# Patient Record
Sex: FEMALE | Race: WHITE
[De-identification: names, ages, dates, MRNs, and addresses within clinical notes are randomized per-mention and may not be internally consistent; named-entity substitution may affect disease eponyms.]

---

## 2019-11-27 ENCOUNTER — TRANSCRIPTION ENCOUNTER (OUTPATIENT)
Age: 39
End: 2019-11-27

## 2019-12-02 ENCOUNTER — INPATIENT (INPATIENT)
Facility: HOSPITAL | Age: 39
LOS: 0 days | Discharge: HOME | DRG: 948 | End: 2019-12-03
Attending: INTERNAL MEDICINE | Admitting: HOSPITALIST
Payer: COMMERCIAL

## 2019-12-02 VITALS
SYSTOLIC BLOOD PRESSURE: 125 MMHG | TEMPERATURE: 98 F | HEART RATE: 81 BPM | RESPIRATION RATE: 17 BRPM | OXYGEN SATURATION: 100 % | DIASTOLIC BLOOD PRESSURE: 83 MMHG

## 2019-12-02 DIAGNOSIS — R47.81 SLURRED SPEECH: ICD-10-CM

## 2019-12-02 LAB
ALBUMIN SERPL ELPH-MCNC: 4 G/DL — SIGNIFICANT CHANGE UP (ref 3.3–5)
ALP SERPL-CCNC: 89 U/L — SIGNIFICANT CHANGE UP (ref 40–120)
ALT FLD-CCNC: 19 U/L — SIGNIFICANT CHANGE UP (ref 12–78)
ANION GAP SERPL CALC-SCNC: 7 MMOL/L — SIGNIFICANT CHANGE UP (ref 5–17)
APTT BLD: 31.1 SEC — SIGNIFICANT CHANGE UP (ref 27.5–36.3)
AST SERPL-CCNC: 15 U/L — SIGNIFICANT CHANGE UP (ref 15–37)
BASOPHILS # BLD AUTO: 0.02 K/UL — SIGNIFICANT CHANGE UP (ref 0–0.2)
BASOPHILS NFR BLD AUTO: 0.3 % — SIGNIFICANT CHANGE UP (ref 0–2)
BILIRUB SERPL-MCNC: 0.3 MG/DL — SIGNIFICANT CHANGE UP (ref 0.2–1.2)
BUN SERPL-MCNC: 7 MG/DL — SIGNIFICANT CHANGE UP (ref 7–23)
CALCIUM SERPL-MCNC: 9 MG/DL — SIGNIFICANT CHANGE UP (ref 8.5–10.1)
CHLORIDE SERPL-SCNC: 106 MMOL/L — SIGNIFICANT CHANGE UP (ref 96–108)
CK SERPL-CCNC: 73 U/L — SIGNIFICANT CHANGE UP (ref 26–192)
CO2 SERPL-SCNC: 27 MMOL/L — SIGNIFICANT CHANGE UP (ref 22–31)
CREAT SERPL-MCNC: 0.67 MG/DL — SIGNIFICANT CHANGE UP (ref 0.5–1.3)
EOSINOPHIL # BLD AUTO: 0.01 K/UL — SIGNIFICANT CHANGE UP (ref 0–0.5)
EOSINOPHIL NFR BLD AUTO: 0.2 % — SIGNIFICANT CHANGE UP (ref 0–6)
ERYTHROCYTE [SEDIMENTATION RATE] IN BLOOD: 4 MM/HR — SIGNIFICANT CHANGE UP (ref 0–15)
GLUCOSE SERPL-MCNC: 89 MG/DL — SIGNIFICANT CHANGE UP (ref 70–99)
HCG UR QL: NEGATIVE — SIGNIFICANT CHANGE UP
HCT VFR BLD CALC: 43 % — SIGNIFICANT CHANGE UP (ref 34.5–45)
HGB BLD-MCNC: 14.2 G/DL — SIGNIFICANT CHANGE UP (ref 11.5–15.5)
IMM GRANULOCYTES NFR BLD AUTO: 0.3 % — SIGNIFICANT CHANGE UP (ref 0–1.5)
INR BLD: 1 RATIO — SIGNIFICANT CHANGE UP (ref 0.88–1.16)
LYMPHOCYTES # BLD AUTO: 1.31 K/UL — SIGNIFICANT CHANGE UP (ref 1–3.3)
LYMPHOCYTES # BLD AUTO: 22 % — SIGNIFICANT CHANGE UP (ref 13–44)
MCHC RBC-ENTMCNC: 29.8 PG — SIGNIFICANT CHANGE UP (ref 27–34)
MCHC RBC-ENTMCNC: 33 GM/DL — SIGNIFICANT CHANGE UP (ref 32–36)
MCV RBC AUTO: 90.1 FL — SIGNIFICANT CHANGE UP (ref 80–100)
MONOCYTES # BLD AUTO: 0.41 K/UL — SIGNIFICANT CHANGE UP (ref 0–0.9)
MONOCYTES NFR BLD AUTO: 6.9 % — SIGNIFICANT CHANGE UP (ref 2–14)
NEUTROPHILS # BLD AUTO: 4.19 K/UL — SIGNIFICANT CHANGE UP (ref 1.8–7.4)
NEUTROPHILS NFR BLD AUTO: 70.3 % — SIGNIFICANT CHANGE UP (ref 43–77)
PLATELET # BLD AUTO: 251 K/UL — SIGNIFICANT CHANGE UP (ref 150–400)
POTASSIUM SERPL-MCNC: 3.6 MMOL/L — SIGNIFICANT CHANGE UP (ref 3.5–5.3)
POTASSIUM SERPL-SCNC: 3.6 MMOL/L — SIGNIFICANT CHANGE UP (ref 3.5–5.3)
PROT SERPL-MCNC: 7.2 GM/DL — SIGNIFICANT CHANGE UP (ref 6–8.3)
PROTHROM AB SERPL-ACNC: 11.1 SEC — SIGNIFICANT CHANGE UP (ref 10–12.9)
RBC # BLD: 4.77 M/UL — SIGNIFICANT CHANGE UP (ref 3.8–5.2)
RBC # FLD: 12.6 % — SIGNIFICANT CHANGE UP (ref 10.3–14.5)
SODIUM SERPL-SCNC: 140 MMOL/L — SIGNIFICANT CHANGE UP (ref 135–145)
TROPONIN I SERPL-MCNC: <0.015 NG/ML — SIGNIFICANT CHANGE UP (ref 0.01–0.04)
WBC # BLD: 5.96 K/UL — SIGNIFICANT CHANGE UP (ref 3.8–10.5)
WBC # FLD AUTO: 5.96 K/UL — SIGNIFICANT CHANGE UP (ref 3.8–10.5)

## 2019-12-02 PROCEDURE — 82164 ANGIOTENSIN I ENZYME TEST: CPT

## 2019-12-02 PROCEDURE — 70496 CT ANGIOGRAPHY HEAD: CPT | Mod: 26

## 2019-12-02 PROCEDURE — 83036 HEMOGLOBIN GLYCOSYLATED A1C: CPT

## 2019-12-02 PROCEDURE — 86235 NUCLEAR ANTIGEN ANTIBODY: CPT

## 2019-12-02 PROCEDURE — 85610 PROTHROMBIN TIME: CPT

## 2019-12-02 PROCEDURE — 72157 MRI CHEST SPINE W/O & W/DYE: CPT

## 2019-12-02 PROCEDURE — 71045 X-RAY EXAM CHEST 1 VIEW: CPT | Mod: 26

## 2019-12-02 PROCEDURE — 86618 LYME DISEASE ANTIBODY: CPT

## 2019-12-02 PROCEDURE — A9579: CPT

## 2019-12-02 PROCEDURE — 93010 ELECTROCARDIOGRAM REPORT: CPT

## 2019-12-02 PROCEDURE — 86225 DNA ANTIBODY NATIVE: CPT

## 2019-12-02 PROCEDURE — 83090 ASSAY OF HOMOCYSTEINE: CPT

## 2019-12-02 PROCEDURE — 86431 RHEUMATOID FACTOR QUANT: CPT

## 2019-12-02 PROCEDURE — 85025 COMPLETE CBC W/AUTO DIFF WBC: CPT

## 2019-12-02 PROCEDURE — 97162 PT EVAL MOD COMPLEX 30 MIN: CPT | Mod: GP

## 2019-12-02 PROCEDURE — 80061 LIPID PANEL: CPT

## 2019-12-02 PROCEDURE — 70498 CT ANGIOGRAPHY NECK: CPT | Mod: 26

## 2019-12-02 PROCEDURE — 84443 ASSAY THYROID STIM HORMONE: CPT

## 2019-12-02 PROCEDURE — 82306 VITAMIN D 25 HYDROXY: CPT

## 2019-12-02 PROCEDURE — 83735 ASSAY OF MAGNESIUM: CPT

## 2019-12-02 PROCEDURE — 72156 MRI NECK SPINE W/O & W/DYE: CPT

## 2019-12-02 PROCEDURE — 86200 CCP ANTIBODY: CPT

## 2019-12-02 PROCEDURE — 84100 ASSAY OF PHOSPHORUS: CPT

## 2019-12-02 PROCEDURE — 85652 RBC SED RATE AUTOMATED: CPT

## 2019-12-02 PROCEDURE — 72157 MRI CHEST SPINE W/O & W/DYE: CPT | Mod: 26

## 2019-12-02 PROCEDURE — 80053 COMPREHEN METABOLIC PANEL: CPT

## 2019-12-02 PROCEDURE — 81001 URINALYSIS AUTO W/SCOPE: CPT

## 2019-12-02 PROCEDURE — 99284 EMERGENCY DEPT VISIT MOD MDM: CPT

## 2019-12-02 PROCEDURE — 70553 MRI BRAIN STEM W/O & W/DYE: CPT

## 2019-12-02 PROCEDURE — 70553 MRI BRAIN STEM W/O & W/DYE: CPT | Mod: 26

## 2019-12-02 PROCEDURE — 85730 THROMBOPLASTIN TIME PARTIAL: CPT

## 2019-12-02 PROCEDURE — 36415 COLL VENOUS BLD VENIPUNCTURE: CPT

## 2019-12-02 PROCEDURE — 86160 COMPLEMENT ANTIGEN: CPT

## 2019-12-02 PROCEDURE — 97116 GAIT TRAINING THERAPY: CPT | Mod: GP

## 2019-12-02 PROCEDURE — 82550 ASSAY OF CK (CPK): CPT

## 2019-12-02 PROCEDURE — 82607 VITAMIN B-12: CPT

## 2019-12-02 PROCEDURE — 83921 ORGANIC ACID SINGLE QUANT: CPT

## 2019-12-02 PROCEDURE — 86039 ANTINUCLEAR ANTIBODIES (ANA): CPT

## 2019-12-02 PROCEDURE — 86255 FLUORESCENT ANTIBODY SCREEN: CPT

## 2019-12-02 RX ORDER — ONDANSETRON 8 MG/1
4 TABLET, FILM COATED ORAL EVERY 6 HOURS
Refills: 0 | Status: DISCONTINUED | OUTPATIENT
Start: 2019-12-02 | End: 2019-12-03

## 2019-12-02 RX ORDER — ACETAMINOPHEN 500 MG
650 TABLET ORAL EVERY 6 HOURS
Refills: 0 | Status: DISCONTINUED | OUTPATIENT
Start: 2019-12-02 | End: 2019-12-02

## 2019-12-02 RX ORDER — ACETAMINOPHEN 500 MG
650 TABLET ORAL EVERY 6 HOURS
Refills: 0 | Status: DISCONTINUED | OUTPATIENT
Start: 2019-12-02 | End: 2019-12-03

## 2019-12-02 RX ORDER — SODIUM CHLORIDE 9 MG/ML
3 INJECTION INTRAMUSCULAR; INTRAVENOUS; SUBCUTANEOUS ONCE
Refills: 0 | Status: COMPLETED | OUTPATIENT
Start: 2019-12-02 | End: 2019-12-02

## 2019-12-02 RX ADMIN — SODIUM CHLORIDE 3 MILLILITER(S): 9 INJECTION INTRAMUSCULAR; INTRAVENOUS; SUBCUTANEOUS at 16:40

## 2019-12-02 NOTE — ED PROVIDER NOTE - CONSTITUTIONAL, MLM
normal... Well appearing, awake, alert, oriented to person, place, time/situation and in no apparent distress. thin.

## 2019-12-02 NOTE — CONSULT NOTE ADULT - SUBJECTIVE AND OBJECTIVE BOX
CC: 39y old  Female who presents with a chief complaint of Numbness/pain/weakness right upper/B LE, speech disturbance, disorientation.     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 Pt states she has been having trouble walking and paresthesias / weakness of LE's since past few days, late this morning her speech was slowed, she had difficulty articulating and she felt slightly disoriented, in addition, she noted her right FMM were not normal, prompting her to come to ED.    Pt further on reports that she started having right hand weakness / pain 2 months ago, was seen in HSS, diagnosed with potential thoracic outlook syndrome, was referred to PT that she has been attending. Pt noted some improvement of right hand paresthesias but noted paresthesias / pain in legs, had difficulty ambulating, tripped / stumbled few times. she was given gabapentin., took one pill without much relief.     Pt has episodes of migraines and neck pain related to sports injury since she was 15, hasn't had any HA in a year and a half.     Pt denies history of preceding fever, rash, diarrhea or tick bites; she reports mild dizziness and more recent mild urinary overflow incontinence.        PAST MEDICAL & SURGICAL HISTORY:  Compartment syndrome LE's; s/p release  Cervical radiculopathy        FAMILY HISTORY: Thyroid disease      Social Hx:  Nonsmoker, no drug or alcohol use, , parent, no dietary prefernces      MEDICATIONS  (STANDING):  Nil    Allergies  No Known Allergies    Intolerances      ROS: Pertinent positives in HPI, all other ROS were reviewed and are negative.        Vital Signs Last 24 Hrs  T(C): 36.4 (02 Dec 2019 14:05), Max: 36.4 (02 Dec 2019 14:05)  T(F): 97.5 (02 Dec 2019 14:05), Max: 97.5 (02 Dec 2019 14:05)  HR: 81 (02 Dec 2019 14:05) (81 - 81)  BP: 125/83 (02 Dec 2019 14:05) (125/83 - 125/83)  BP(mean): --  RR: 17 (02 Dec 2019 14:05) (17 - 17)  SpO2: 100% (02 Dec 2019 14:05) (100% - 100%)      Gen exam:  Normocephalic, not in distress  HEENT: PERRLA, EOMI,   Neck: Supple.  Respiratory: Breath sounds are clear bilaterally  Cardiovascular: S1 and S2, regular   Extremities:  no edema  Vascular: Caritid Bruit - no  Musculoskeletal: no joint swelling/tenderness, no abnormal movements  Skin: No rashes    Neurological exam:  HF: A x O x 3. Appropriately interactive, normal affect. Speech fluent, No Aphasia or paraphasic errors. Naming /repetition intact   CN: SOPHIA, EOMI, VFF, facial sensation normal, no NLFD, tongue midline, Palate moves equally, SCM equal bilaterally  Motor: No pronator drift, Strength 5/5 in all 4 ext, normal bulk and tone, no tremor, rigidity .    Sens: Decreased PP right L4/5, Sensory level T7/8   Reflexes: BJ 2+, BR 3+, KJ 3++, AJ 2+, downgoing toes b/l, no clonus  Coord:  No FNFA, dysmetria, ALICE intact   Gait/Balance: Not tested      Labs:   12-02    140  |  106  |  7   ----------------------------<  89  3.6   |  27  |  0.67    Ca    9.0      02 Dec 2019 14:56    TPro  7.2  /  Alb  4.0  /  TBili  0.3  /  DBili  x   /  AST  15  /  ALT  19  /  AlkPhos  89  12-02                          14.2   5.96  )-----------( 251      ( 02 Dec 2019 14:56 )             43.0       Radiology:  - CT: < from: CT Angio Neck w/ IV Cont (12.02.19 @ 15:18) >  Carotid circulation: No hemodynamically significant stenosis as above.    Intracranial circulation:  No hemodynamically significant stenosis.    Brain:  No acute infarct or hemorrhage.

## 2019-12-02 NOTE — ED ADULT NURSE NOTE - OBJECTIVE STATEMENT
pt to ed for increasing weakness, change in speech, and feelings of disorientation. Pt ambulatory w/ distress, speech noted to be normal. VSS. Complains of generalized weakNESS. Pt reports nerve issues and has been seeing a neurologist and was advised if her symptoms worsened she should come to ED. PT a&ox3, w/o distress. Right wrist brace in place due to nerve issues and weakness as per pt.

## 2019-12-02 NOTE — ED STATDOCS - NS_ ATTENDINGSCRIBEDETAILS _ED_A_ED_FT
I, Miky Cárdenas MD,  performed the initial face to face bedside interview with this patient regarding history of present illness, review of symptoms and relevant past medical, social and family history.  I completed an independent physical examination.  I was the initial provider who evaluated this patient.  The history, relevant review of systems, past medical and surgical history, medical decision making, and physical examination was documented by the scribe in my presence and I attest to the accuracy of the documentation.

## 2019-12-02 NOTE — ED PROVIDER NOTE - PROGRESS NOTE DETAILS
d/w dr. forbes , attdg neuro oncall. agrees with admit. MRIs in the AM. d/w pt. agreeable with admit. left vm on hospitalist admit phone. MD SARITHA  saw pt. in ED. rec mri brain and thoracic with and without contrast and vascultis work up. MD SARITHA

## 2019-12-02 NOTE — ED ADULT NURSE REASSESSMENT NOTE - NS ED NURSE REASSESS COMMENT FT1
Patient received from previous nurse. Pt is A&Ox4, VSS on RA. Pt is resting comfortably in their bed at this time, denies any pain or discomfort at this time with family at bedside. Safety and comfort measures in place. Hourly rounding will be done on my time. Will continue to monitor.

## 2019-12-02 NOTE — H&P ADULT - HISTORY OF PRESENT ILLNESS
38 y/o F with PMH of compartment syndrome of B/L LEs s/p surgery, cervical radiculopathy, migraines, p/w numbness / tingling of hands / feet, disorientation, slowed speech. Patient states that on 10/11/19 she developed R hand stiffness, numbness, pain and weakness. For example she was unable to hold a cup of coffee or mug with that hand. She also had neck pain. She had a MRI of cervical spine and was told she had cervical radiculopathy. She has been having physical therapy, and that has improved symptoms greatly, but not 100% yet. Patient states that at the time she also had some numbness and pain of the feet as well. She has also been told she may have carpal tunnel and thoracic outlet syndrome. On 11/24/19 she started to develop increasing numbness and pain of B/L feet and also legs. States that she was starting lose her balance and was tripping over a lot. She felt like she was hobbling to the bathroom. Over the weekend she also developed slowing of speech, felt like her lips weren't able to keep up with speaking. She also felt a bit disoriented, she would turn on hot water to brush teeth for example. She has been some occasional back pain. Denies any urinary incontinence, except occasionally when she sneezes. Denies fecal incontinence. Denies saddle anesthesia. Denies fever, chills, cough, runny nose, sore throat, nausea, vomiting, abdominal pain, CP, SOB, dysuria, increased frequency, neck pain, photophobia, phonophobia, HA, abdominal pain.     PSH: Compartment syndrome B/L LEs s/p surgery    Social Hx: Denies tobacco, etoh - approx 1 drink per week, drugs - denies     Family Hx: Father - thyroid cancer, prostate cancer, mother - breast cancer / thyroid disease

## 2019-12-02 NOTE — ED PROVIDER NOTE - CLINICAL SUMMARY MEDICAL DECISION MAKING FREE TEXT BOX
Pt had CTA brain, neck in ED that were normal, labs normal, plan to discuss with neuro for any further testing in the ED and dispo

## 2019-12-02 NOTE — ED PROVIDER NOTE - NEUROLOGICAL, MLM
Alert and oriented, no focal deficits, no motor or sensory deficits. NIHSS 0 Alert and oriented, no focal deficits, no motor or sensory deficits. NIHSS 0, 2+ patellar reflex bilateral, 2+ UE relflex Bilateral

## 2019-12-02 NOTE — CONSULT NOTE ADULT - ASSESSMENT
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 She presents to the ED today with c/o worsening paresthesias / feeling of weakness LE's s, slowed speech, had difficulty articulating and felt slightly disoriented, in addition, noted her right FMM were not normal. Pt denies preceding fever, rash, diarrhea or tick bites; she reports mild dizziness and more recent mild urinary overflow incontinence.    # Rule out myelitis thoracic spine; demyelinating disease or Mononeuritis multiplex    - Obtain MRI brain/ thoracic spine with contrast.  - labs for vasculitis / autoimmune ds  - As D/W pt, may need spinal tap  - PT eval    D/W pt, her family and Dr. Coleman

## 2019-12-02 NOTE — H&P ADULT - NEUROLOGICAL DETAILS
responds to pain/responds to verbal commands/superficial reflexes intact/alert and oriented x 3/cranial nerves intact/normal strength

## 2019-12-02 NOTE — ED PROVIDER NOTE - OBJECTIVE STATEMENT
38 y/o female with a PMHx of cervical radiculopathy presents to the ED c/o disoriented and slowed speech. Pt states she has been having trouble walking the past few days. Started late this morning pt states her speech was even more slowed and she was very disoriented. Pt had an episode where her hand stopped working and is in physical therapy and diagnosed with potential thoracic outlook syndrome. Pt found that for the past week her legs have been numb and she has been stumbling. No recent trauma. Pt took one pill of gabapentin but nothing besides that. Nonsmoker. Pt has episodes of migraines from a sports injury when she was 15 but hasn't had one in a year and a half. Pt states these symptoms are not like the migraines. No hx of any neurological issues. Pt ate today and denies nausea or vomiting. No tick bites that she knows of.

## 2019-12-02 NOTE — ED STATDOCS - CLINICAL SUMMARY MEDICAL DECISION MAKING FREE TEXT BOX
39F presents to the ED with slurred speech, difficulty getting words out, numbness. Sx worsening over past few days. Last known normal 2 days ago. Will send pt to main ED for further evaluation.

## 2019-12-02 NOTE — H&P ADULT - ASSESSMENT
40 y/o F with PMH of compartment syndrome of B/L LEs s/p surgery, cervical radiculopathy, migraines, p/w numbness / tingling of hands / feet, disorientation, slowed speech    *Paresthesias / weakness of hands and feet + Disorientation + slowed speech   -CTA does not report any acute findings  -Patient was evaluated at bedside by Neurologist Dr. Haynes -> recommends brain / thoracic MRI w/ contrast and work up for vasculitis / autoimmune disease + may need spinal tap  -Rheumatology consult for further evaluation  -PT eval  -Fall precautions    *DVT ppx  -SCDs 40 y/o F with PMH of compartment syndrome of B/L LEs s/p surgery, cervical radiculopathy, migraines, p/w numbnes / tingling of hands / feet, disorientation, slowed speech    *Paresthesias / weakness of hands and feet + Disorientation + slowed speech   -CTA does not report any acute findings  -Patient was evaluated at bedside by Neurologist Dr. Haynes -> recommends brain / thoracic MRI w/ contrast and work up for vasculitis / autoimmune disease + may need spinal tap  -Rheumatology consult for further evaluation  -PT eval  -Fall precautions    *DVT ppx  -SCDs       IMPROVE VTE Individual Risk Assessment    RISK                                                                Points    [  ] Previous VTE                                                  3    [  ] Thrombophilia                                               2    [  ] Lower limb paralysis                                      2        (unable to hold up >15 seconds)      [  ] Current Cancer                                              2         (within 6 months)    [  ] Immobilization > 24 hrs                                1    [  ] ICU/CCU stay > 24 hours                              1    [  ] Age > 60                                                      1    IMPROVE VTE Score _____0____    IMPROVE Score 0-1: Low Risk, No VTE prophylaxis required for most patients, encourage ambulation.   IMPROVE Score 2-3: At risk, pharmacologic VTE prophylaxis is indicated for most patients (in the absence of a contraindication)  IMPROVE Score > or = 4: High Risk, pharmacologic VTE prophylaxis is indicated for most patients (in the absence of a contraindication)

## 2019-12-02 NOTE — ED STATDOCS - PROGRESS NOTE DETAILS
Elyse HILLIARD for ED attending, Dr. Cárdenas: 39F presents to the ED with slurred speech, difficulty getting words out, numbness. Sx worsening over past few days, Last known normal 2 days ago. Will send pt to main ED for further evaluation.

## 2019-12-02 NOTE — ED ADULT TRIAGE NOTE - CHIEF COMPLAINT QUOTE
pt aaox4, pt presents to er for disorientation started this morning around 1100, pt states she was recently diagnosed with cervical radiculopathy and posible thoracic outlet syndrome 2 months ago, started to have difficulty walking 3 days ago, having difficulty talking and disorientation with some numbness/pain b/l lower extremities.  md cortes made aware, stroke eval done in [place], fs 75, code stroke not called, lams 0

## 2019-12-03 ENCOUNTER — TRANSCRIPTION ENCOUNTER (OUTPATIENT)
Age: 39
End: 2019-12-03

## 2019-12-03 VITALS
TEMPERATURE: 98 F | OXYGEN SATURATION: 100 % | SYSTOLIC BLOOD PRESSURE: 134 MMHG | HEART RATE: 85 BPM | RESPIRATION RATE: 18 BRPM | DIASTOLIC BLOOD PRESSURE: 77 MMHG

## 2019-12-03 LAB
24R-OH-CALCIDIOL SERPL-MCNC: 19.1 NG/ML — LOW (ref 30–80)
ALBUMIN SERPL ELPH-MCNC: 3.5 G/DL — SIGNIFICANT CHANGE UP (ref 3.3–5)
ALP SERPL-CCNC: 78 U/L — SIGNIFICANT CHANGE UP (ref 40–120)
ALT FLD-CCNC: 17 U/L — SIGNIFICANT CHANGE UP (ref 12–78)
ANION GAP SERPL CALC-SCNC: 5 MMOL/L — SIGNIFICANT CHANGE UP (ref 5–17)
APPEARANCE UR: CLEAR — SIGNIFICANT CHANGE UP
APTT BLD: 30.8 SEC — SIGNIFICANT CHANGE UP (ref 27.5–36.3)
AST SERPL-CCNC: 12 U/L — LOW (ref 15–37)
BASOPHILS # BLD AUTO: 0.02 K/UL — SIGNIFICANT CHANGE UP (ref 0–0.2)
BASOPHILS NFR BLD AUTO: 0.4 % — SIGNIFICANT CHANGE UP (ref 0–2)
BILIRUB SERPL-MCNC: 0.6 MG/DL — SIGNIFICANT CHANGE UP (ref 0.2–1.2)
BILIRUB UR-MCNC: NEGATIVE — SIGNIFICANT CHANGE UP
BUN SERPL-MCNC: 10 MG/DL — SIGNIFICANT CHANGE UP (ref 7–23)
CALCIUM SERPL-MCNC: 8.4 MG/DL — LOW (ref 8.5–10.1)
CHLORIDE SERPL-SCNC: 105 MMOL/L — SIGNIFICANT CHANGE UP (ref 96–108)
CHOLEST SERPL-MCNC: 160 MG/DL — SIGNIFICANT CHANGE UP (ref 10–199)
CK SERPL-CCNC: 52 U/L — SIGNIFICANT CHANGE UP (ref 26–192)
CO2 SERPL-SCNC: 27 MMOL/L — SIGNIFICANT CHANGE UP (ref 22–31)
COLOR SPEC: YELLOW — SIGNIFICANT CHANGE UP
CREAT SERPL-MCNC: 0.79 MG/DL — SIGNIFICANT CHANGE UP (ref 0.5–1.3)
DIFF PNL FLD: ABNORMAL
EOSINOPHIL # BLD AUTO: 0.07 K/UL — SIGNIFICANT CHANGE UP (ref 0–0.5)
EOSINOPHIL NFR BLD AUTO: 1.4 % — SIGNIFICANT CHANGE UP (ref 0–6)
GLUCOSE SERPL-MCNC: 87 MG/DL — SIGNIFICANT CHANGE UP (ref 70–99)
GLUCOSE UR QL: NEGATIVE MG/DL — SIGNIFICANT CHANGE UP
HBA1C BLD-MCNC: 4.8 % — SIGNIFICANT CHANGE UP (ref 4–5.6)
HCT VFR BLD CALC: 39.7 % — SIGNIFICANT CHANGE UP (ref 34.5–45)
HDLC SERPL-MCNC: 65 MG/DL — SIGNIFICANT CHANGE UP
HGB BLD-MCNC: 13.2 G/DL — SIGNIFICANT CHANGE UP (ref 11.5–15.5)
IMM GRANULOCYTES NFR BLD AUTO: 0.2 % — SIGNIFICANT CHANGE UP (ref 0–1.5)
INR BLD: 1.06 RATIO — SIGNIFICANT CHANGE UP (ref 0.88–1.16)
KETONES UR-MCNC: ABNORMAL
LEUKOCYTE ESTERASE UR-ACNC: ABNORMAL
LIPID PNL WITH DIRECT LDL SERPL: 83 MG/DL — SIGNIFICANT CHANGE UP
LYME C6 AB IGG/IGM EIA REFLEX WESTERN BL: SIGNIFICANT CHANGE UP
LYMPHOCYTES # BLD AUTO: 1.53 K/UL — SIGNIFICANT CHANGE UP (ref 1–3.3)
LYMPHOCYTES # BLD AUTO: 31.3 % — SIGNIFICANT CHANGE UP (ref 13–44)
MAGNESIUM SERPL-MCNC: 2 MG/DL — SIGNIFICANT CHANGE UP (ref 1.6–2.6)
MCHC RBC-ENTMCNC: 30 PG — SIGNIFICANT CHANGE UP (ref 27–34)
MCHC RBC-ENTMCNC: 33.2 GM/DL — SIGNIFICANT CHANGE UP (ref 32–36)
MCV RBC AUTO: 90.2 FL — SIGNIFICANT CHANGE UP (ref 80–100)
MONOCYTES # BLD AUTO: 0.36 K/UL — SIGNIFICANT CHANGE UP (ref 0–0.9)
MONOCYTES NFR BLD AUTO: 7.4 % — SIGNIFICANT CHANGE UP (ref 2–14)
NEUTROPHILS # BLD AUTO: 2.9 K/UL — SIGNIFICANT CHANGE UP (ref 1.8–7.4)
NEUTROPHILS NFR BLD AUTO: 59.3 % — SIGNIFICANT CHANGE UP (ref 43–77)
NITRITE UR-MCNC: NEGATIVE — SIGNIFICANT CHANGE UP
PH UR: 6 — SIGNIFICANT CHANGE UP (ref 5–8)
PHOSPHATE SERPL-MCNC: 2.7 MG/DL — SIGNIFICANT CHANGE UP (ref 2.5–4.5)
PLATELET # BLD AUTO: 211 K/UL — SIGNIFICANT CHANGE UP (ref 150–400)
POTASSIUM SERPL-MCNC: 3.8 MMOL/L — SIGNIFICANT CHANGE UP (ref 3.5–5.3)
POTASSIUM SERPL-SCNC: 3.8 MMOL/L — SIGNIFICANT CHANGE UP (ref 3.5–5.3)
PROT SERPL-MCNC: 6.4 GM/DL — SIGNIFICANT CHANGE UP (ref 6–8.3)
PROT UR-MCNC: 30 MG/DL
PROTHROM AB SERPL-ACNC: 11.8 SEC — SIGNIFICANT CHANGE UP (ref 10–12.9)
RBC # BLD: 4.4 M/UL — SIGNIFICANT CHANGE UP (ref 3.8–5.2)
RBC # FLD: 12.7 % — SIGNIFICANT CHANGE UP (ref 10.3–14.5)
RHEUMATOID FACT SERPL-ACNC: <10 IU/ML — SIGNIFICANT CHANGE UP (ref 0–13)
SODIUM SERPL-SCNC: 137 MMOL/L — SIGNIFICANT CHANGE UP (ref 135–145)
SP GR SPEC: 1.02 — SIGNIFICANT CHANGE UP (ref 1.01–1.02)
TOTAL CHOLESTEROL/HDL RATIO MEASUREMENT: 2.5 RATIO — LOW (ref 3.3–7.1)
TRIGL SERPL-MCNC: 62 MG/DL — SIGNIFICANT CHANGE UP (ref 10–149)
UROBILINOGEN FLD QL: NEGATIVE MG/DL — SIGNIFICANT CHANGE UP
VIT B12 SERPL-MCNC: 818 PG/ML — SIGNIFICANT CHANGE UP (ref 232–1245)
WBC # BLD: 4.89 K/UL — SIGNIFICANT CHANGE UP (ref 3.8–10.5)
WBC # FLD AUTO: 4.89 K/UL — SIGNIFICANT CHANGE UP (ref 3.8–10.5)

## 2019-12-03 PROCEDURE — 99233 SBSQ HOSP IP/OBS HIGH 50: CPT

## 2019-12-03 PROCEDURE — 99254 IP/OBS CNSLTJ NEW/EST MOD 60: CPT

## 2019-12-03 PROCEDURE — 72156 MRI NECK SPINE W/O & W/DYE: CPT | Mod: 26

## 2019-12-03 NOTE — DISCHARGE NOTE PROVIDER - HOSPITAL COURSE
38 y/o F with PMH of compartment syndrome of B/L LEs s/p surgery, cervical radiculopathy, migraines, p/w numbness / tingling of hands / feet, disorientation, slowed speech. Patient states that on 10/11/19 she developed R hand stiffness, numbness, pain and weakness. For example she was unable to hold a cup of coffee or mug with that hand. She also had neck pain. She had a MRI of cervical spine and was told she had cervical radiculopathy. She has been having physical therapy, and that has improved symptoms greatly, but not 100% yet. Patient states that at the time she also had some numbness and pain of the feet as well. She has also been told she may have carpal tunnel and thoracic outlet syndrome. On 11/24/19 she started to develop increasing numbness and pain of B/L feet and also legs. States that she was starting lose her balance and was tripping over a lot. She felt like she was hobbling to the bathroom. Over the weekend she also developed slowing of speech, felt like her lips weren't able to keep up with speaking.     So far her w/u has been negative, Rheum service was asked to pt to r/o CTD    She admits to a remote h/o Raynauds. She denies alopecia, oral ulcers, sicca sx or joint pains on a daily basis. She denies asthma frequent sinus or ear infections.         pt has features that are suggesting anorexia, v thin, looks 10 years younger than her age         F/U labs for vasculitis / autoimmune ds, deficiency states    - AT this time, need for spinal tap is not mandatory, as pt has soft sx and is not keen on LP    - PT will need EMG/NCV studies as OP    cleared for dc by neuro    esr of 4

## 2019-12-03 NOTE — CONSULT NOTE ADULT - ASSESSMENT
38 y/o F with PMH of compartment syndrome of B/L LEs s/p surgery, cervical radiculopathy, migraines, p/w numbness / tingling of hands / feet, disorientation, slowed speech. Patient states that on 10/11/19 she developed R hand stiffness, numbness, pain and weakness. For example she was unable to hold a cup of coffee or mug with that hand. She also had neck pain. She was dx with cervical radiculopathy with improvement in sx with PT   She now comes in worsening sx, she has been seen neurology, so far imaging has been normal. Rheum service was asked to see pt to r/o CTD.   At this time her review of systems is positive for a remote h/o raynauds and her current neurological sx. On my exam, there is a paucity of clinical findings to support the dx for an underlying autoimmune issue.    At this time my suspicion for an underlying CTD is low but for completion pt is to have baseline labs done    Plan-  - will obtain serologies to r/o CTD  - pt to call in 1 week to outpt will review labs if there is any abnormality she will come in for f/u  - pt agreeable to plan

## 2019-12-03 NOTE — PHYSICAL THERAPY INITIAL EVALUATION ADULT - ACTIVE RANGE OF MOTION EXAMINATION, REHAB EVAL
bilateral upper extremity Active ROM was WFL (within functional limits)/bilateral  lower extremity Active ROM was WFL (within functional limits)/LUE lagging vs R, maintains hands stiffly

## 2019-12-03 NOTE — PHYSICAL THERAPY INITIAL EVALUATION ADULT - PERTINENT HX OF CURRENT PROBLEM, REHAB EVAL
PMH of compartment syndrome of B/L LEs s/p surgery, cervical radiculopathy, migraines,recent poss dx of thoracic outlet syndrome p/w numbness / tingling of hands / feet, disorientation, slowed speech. CTA head/neck neg. , MRI TS neg ac. path (+mild scoliosis), MRI head neg ac. finding (except +sinusitis)

## 2019-12-03 NOTE — PROGRESS NOTE ADULT - SUBJECTIVE AND OBJECTIVE BOX
Pt reports aching and paresthesias lower extremities; slightly improved, tightness around mouth has resolved.  Able to ambulate well, no bladder/bowel dysfx    MRI brain and thoracic spine reveal no lesion c/w inflammatory / demyelinating ds  Labs ESR 4, CPK nl     ROS: As above; other ROS Negative    MEDICATIONS  (STANDING):  Nil    Vital Signs Last 24 Hrs  T(C): 36.8 (03 Dec 2019 11:00), Max: 36.9 (02 Dec 2019 19:20)  T(F): 98.2 (03 Dec 2019 11:00), Max: 98.4 (02 Dec 2019 19:20)  HR: 85 (03 Dec 2019 11:00) (72 - 85)  BP: 134/77 (03 Dec 2019 11:00) (104/61 - 134/77)  BP(mean): --  RR: 18 (03 Dec 2019 11:00) (16 - 18)  SpO2: 100% (03 Dec 2019 11:00) (98% - 100%)    Gen exam:  Normocephalic, not in distress  HEENT: PERRLA, EOMI,   Neck: Supple.  Respiratory: Breath sounds are clear bilaterally  Cardiovascular: S1 and S2, regular   Extremities:  no edema  Vascular: Caritid Bruit - no  Musculoskeletal: no joint swelling/tenderness, no abnormal movements  Skin: No rashes    Neurological exam:  HF: A x O x 3. Appropriately interactive, normal affect. Speech fluent, No Aphasia or paraphasic errors. Naming /repetition intact   CN: SOPHIA, EOMI, VFF, facial sensation normal, no NLFD, tongue midline, Palate moves equally, SCM equal bilaterally  Motor: No pronator drift, Strength 5/5 in all 4 ext, normal bulk and tone, no tremor, rigidity .    Sens: Decreased PP right L4/5, Sensory level T7/8 less distinct  Reflexes: BJ 2+, BR 3+, KJ 3++, AJ 2+, downgoing toes b/l, no clonus  Coord:  No FNFA, dysmetria, ALICE intact   Gait/Balance: Not tested    Labs:    Creatine Kinase, Serum: 73 U/L (12.02.19 @ 14:56)  Sedimentation Rate, Erythrocyte: 4 mm/hr (12.02.19 @ 17:10)                         13.2   4.89  )-----------( 211      ( 03 Dec 2019 07:44 )             39.7     12-03    137  |  105  |  10  ----------------------------<  87  3.8   |  27  |  0.79    Ca    8.4<L>      03 Dec 2019 07:44  Phos  2.7     12-03  Mg     2.0     12-03    TPro  6.4  /  Alb  3.5  /  TBili  0.6  /  DBili  x   /  AST  12<L>  /  ALT  17  /  AlkPhos  78  12-03      Radiology report:  < from: MR Head w/wo IV Cont (12.02.19 @ 22:03) >  IMPRESSION:   1. No evidence of acute intracranial pathology.   2. Fluidlevel is in dependent portion of right sphenoid sinus. Finding   is consistent with acute sinusitis.    < from: MR Thoracic Spine w/wo IV Cont (12.02.19 @ 22:07) >  IMPRESSION:   1. Mild scoliosis in thoracic spine. Note loss of normal cervical   lordosis which may reflect pain or muscle spasm on  sequence  2. No evidence of acute pathology.

## 2019-12-03 NOTE — DISCHARGE NOTE PROVIDER - REASON FOR ADMISSION
p/w numbness / tingling of hands / feet, disorientation, slowed speech p/w numbnes / tingling of hands / feet, disorientation, slowed speech

## 2019-12-03 NOTE — DISCHARGE NOTE PROVIDER - PROVIDER TOKENS
PROVIDER:[TOKEN:[[id number]]],PROVIDER:[TOKEN:[[id number]]],PROVIDER:[TOKEN:[[id number]]]

## 2019-12-03 NOTE — PHYSICAL THERAPY INITIAL EVALUATION ADULT - GENERAL OBSERVATIONS, REHAB EVAL
pt rec'd supine in bed on peds. pleased to see PT. c/o are variable and pt reports no exacerbating factors, no remitting factors except rest. upon supine to sit pt reports R foot feeling cold-PT asked about ?Raynaud's -pt reports remote hx. PT also inquiring re: ?vit def-pt reports poss. D def.

## 2019-12-03 NOTE — DISCHARGE NOTE PROVIDER - CARE PROVIDERS DIRECT ADDRESSES
[email or web address]

## 2019-12-03 NOTE — DISCHARGE NOTE PROVIDER - CARE PROVIDER_API CALL
Kristie Pettit)  Neurology  [address], [address]  [place] [address]  Phone: [phone]  Fax: [phone]  Follow Up Time:     Percy Gutierrez ()  Internal Medicine; Rheumatology  [address] [place] [address]  Phone: [phone]  Fax: [phone]  Follow Up Time:     Gina Garber)  Cardiovascular Disease; Internal Medicine  [address] [place] [address]  Phone: [phone]  Fax: [phone]  Follow Up Time:

## 2019-12-03 NOTE — CONSULT NOTE ADULT - SUBJECTIVE AND OBJECTIVE BOX
CHIEF COMPLAINT: numbness    HPI:  40 y/o F with PMH of compartment syndrome of B/L LEs s/p surgery, cervical radiculopathy, migraines, p/w numbness / tingling of hands / feet, disorientation, slowed speech. Patient states that on 10/11/19 she developed R hand stiffness, numbness, pain and weakness. For example she was unable to hold a cup of coffee or mug with that hand. She also had neck pain. She had a MRI of cervical spine and was told she had cervical radiculopathy. She has been having physical therapy, and that has improved symptoms greatly, but not 100% yet. Patient states that at the time she also had some numbness and pain of the feet as well. She has also been told she may have carpal tunnel and thoracic outlet syndrome. On 11/24/19 she started to develop increasing numbness and pain of B/L feet and also legs. States that she was starting lose her balance and was tripping over a lot. She felt like she was hobbling to the bathroom. Over the weekend she also developed slowing of speech, felt like her lips weren't able to keep up with speaking.   So far her w/u has been negative, Rheum service was asked to pt to r/o CTD  She admits to a remote h/o Raynauds. She denies alopecia, oral ulcers, sicca sx or joint pains on a daily basis. She denies asthma frequent sinus or ear infections.     PSH: Compartment syndrome B/L LEs s/p surgery    Social Hx: Denies tobacco, etoh - approx 1 drink per week, drugs - denies     Family Hx: Father - thyroid cancer, prostate cancer, mother - breast cancer / thyroid disease (02 Dec 2019 20:24)      REVIEW OF SYSTEMS:    CONSTITUTIONAL: No weakness, fevers or chills  EYES/ENT: No visual changes;  No vertigo or throat pain   NECK: No pain or stiffness  RESPIRATORY: No cough, wheezing, hemoptysis; No shortness of breath  CARDIOVASCULAR: No chest pain or palpitations  GASTROINTESTINAL: No abdominal or epigastric pain. No nausea, vomiting, or hematemesis; No diarrhea or constipation. No melena or hematochezia.  GENITOURINARY: No dysuria, frequency or hematuria  NEUROLOGICAL: No weakness  SKIN: No itching, burning, rashes, or lesions   All other review of systems is negative unless indicated above    Vital Signs Last 24 Hrs  T(C): 36.8 (03 Dec 2019 11:00), Max: 36.9 (02 Dec 2019 19:20)  T(F): 98.2 (03 Dec 2019 11:00), Max: 98.4 (02 Dec 2019 19:20)  HR: 85 (03 Dec 2019 11:00) (72 - 85)  BP: 134/77 (03 Dec 2019 11:00) (104/61 - 134/77)  BP(mean): --  RR: 18 (03 Dec 2019 11:00) (16 - 18)  SpO2: 100% (03 Dec 2019 11:00) (98% - 100%)    I&O's Summary    02 Dec 2019 07:01  -  03 Dec 2019 07:00  --------------------------------------------------------  IN: 0 mL / OUT: 640 mL / NET: -640 mL    03 Dec 2019 07:01  -  03 Dec 2019 15:21  --------------------------------------------------------  IN: 0 mL / OUT: 1100 mL / NET: -1100 mL        CAPILLARY BLOOD GLUCOSE          PHYSICAL EXAM:    HEENT- no oral lesions noted, no lymphadenoapthy noted  Heart- S1 S2 reg  Lungs- CTA b/l  Abd- Soft NT  Ext- no edema  Skin- no rashes  Musculoskelatal- FROM all joints, no active synovitis noted  Neurological- intact strength upper and lower extremities  MEDICATIONS:  MEDICATIONS  (STANDING):      LABS: All Labs Reviewed:                        13.2   4.89  )-----------( 211      ( 03 Dec 2019 07:44 )             39.7     12-03    137  |  105  |  10  ----------------------------<  87  3.8   |  27  |  0.79    Ca    8.4<L>      03 Dec 2019 07:44  Phos  2.7     12-03  Mg     2.0     12-03    TPro  6.4  /  Alb  3.5  /  TBili  0.6  /  DBili  x   /  AST  12<L>  /  ALT  17  /  AlkPhos  78  12-03    PT/INR - ( 03 Dec 2019 07:44 )   PT: 11.8 sec;   INR: 1.06 ratio         PTT - ( 03 Dec 2019 07:44 )  PTT:30.8 sec  CARDIAC MARKERS ( 02 Dec 2019 14:56 )  <0.015 ng/mL / x     / 73 U/L / x     / x          Blood Culture:     RADIOLOGY/EKG:    A/P

## 2019-12-03 NOTE — PROGRESS NOTE ADULT - ASSESSMENT
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 She presents to the ED today with c/o worsening paresthesias / feeling of weakness LE's s, slowed speech, had difficulty articulating and felt slightly disoriented, in addition, noted her right FMM were not normal. Pt denies preceding fever, rash, diarrhea or tick bites; she reports mild dizziness and more recent mild urinary overflow incontinence.    # Paresthesias, muscle spasms multi - focal; multiplex. MRI brain and thoracic spine reveal no lesions c/w inflammatory or demyelinating ds; mild scoliosis thoracic spine noted. ESR 4, CPK normal, makes myositis or inflammatory ds less likely. 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

## 2019-12-03 NOTE — DISCHARGE NOTE NURSING/CASE MANAGEMENT/SOCIAL WORK - PATIENT PORTAL LINK FT
You can access the FollowMyHealth Patient Portal offered by [facility] by registering at the following website: [email or web address]. By joining [employer]’s FollowMyHealth portal, you will also be able to view your health information using other applications (apps) compatible with our system.

## 2019-12-03 NOTE — PHYSICAL THERAPY INITIAL EVALUATION ADULT - ADDITIONAL COMMENTS
p/w numbness / tingling of hands / feet, disorientation, slowed speech. Patient states that on 10/11/19 she developed R hand stiffness, numbness, pain and weakness. For example she was unable to hold a cup of coffee or mug with that hand. She also had neck pain. She had a MRI of cervical spine and was told she had cervical radiculopathy. She has been having physical therapy, and that has improved symptoms greatly, but not 100% yet. Patient states that at the time she also had some numbness and pain of the feet as well. She has also been told she may have carpal tunnel and thoracic outlet syndrome. On 11/24/19 she started to develop increasing numbness and pain of B/L feet and also legs. States that she was starting lose her balance and was tripping over a lot. She felt like she was hobbling to the bathroom. Over the weekend she also developed slowing of speech. to this writer pt also c/o fatigue and worsening of unsteadiness w/ fatigue.

## 2019-12-03 NOTE — DISCHARGE NOTE PROVIDER - NSDCCPCAREPLAN_GEN_ALL_CORE_FT
PRINCIPAL DISCHARGE DIAGNOSIS  Diagnosis: Slurred speech  Assessment and Plan of Treatment: f/up with Neurology Dr Jauregui has a virgil in the office Dr Pettit might not be able to do the EMG any time soon PRINCIPAL DISCHARGE DIAGNOSIS  Diagnosis: Slurred speech  Assessment and Plan of Treatment: f/up with Neurology Dr Jauregui has a virgil in the office Dr Pettit might not be able to do the EMG any time soon. Vitamin D was low take over the counter vit.d

## 2019-12-04 PROBLEM — Z00.00 ENCOUNTER FOR PREVENTIVE HEALTH EXAMINATION: Status: ACTIVE | Noted: 2019-12-04

## 2019-12-04 PROBLEM — M54.12 RADICULOPATHY, CERVICAL REGION: Chronic | Status: ACTIVE | Noted: 2019-12-02

## 2019-12-04 LAB
CCP IGG SERPL-ACNC: <8 UNITS — SIGNIFICANT CHANGE UP (ref 0–19)
CENTROMERE AB SER-ACNC: <0.2 AI — SIGNIFICANT CHANGE UP
DRVVT SCREEN TO CONFIRM RATIO: SIGNIFICANT CHANGE UP
DSDNA AB FLD-ACNC: <0.2 AI — SIGNIFICANT CHANGE UP
DSDNA AB SER-ACNC: <12 IU/ML — SIGNIFICANT CHANGE UP
ENA SS-A AB FLD IA-ACNC: <0.2 AI — SIGNIFICANT CHANGE UP
LA NT DPL PPP QL: 25.3 SEC — SIGNIFICANT CHANGE UP
NORMALIZED SCT PPP-RTO: 0.91 RATIO — SIGNIFICANT CHANGE UP (ref 0–1.16)
NORMALIZED SCT PPP-RTO: SIGNIFICANT CHANGE UP
RF+CCP IGG SER-IMP: NEGATIVE — SIGNIFICANT CHANGE UP

## 2019-12-05 ENCOUNTER — INBOUND DOCUMENT (OUTPATIENT)
Age: 39
End: 2019-12-05

## 2019-12-05 LAB
ACE SERPL-CCNC: 42 U/L — SIGNIFICANT CHANGE UP (ref 14–82)
ANA PAT FLD IF-IMP: ABNORMAL
ANA TITR SER: ABNORMAL

## 2019-12-06 DIAGNOSIS — R53.1 WEAKNESS: ICD-10-CM

## 2019-12-06 DIAGNOSIS — R20.0 ANESTHESIA OF SKIN: ICD-10-CM

## 2019-12-06 DIAGNOSIS — R41.0 DISORIENTATION, UNSPECIFIED: ICD-10-CM

## 2019-12-06 DIAGNOSIS — R63.0 ANOREXIA: ICD-10-CM

## 2019-12-06 DIAGNOSIS — R20.2 PARESTHESIA OF SKIN: ICD-10-CM

## 2019-12-06 DIAGNOSIS — M62.838 OTHER MUSCLE SPASM: ICD-10-CM

## 2019-12-06 DIAGNOSIS — G54.0 BRACHIAL PLEXUS DISORDERS: ICD-10-CM

## 2019-12-06 DIAGNOSIS — I73.00 RAYNAUD'S SYNDROME WITHOUT GANGRENE: ICD-10-CM

## 2019-12-06 DIAGNOSIS — R47.81 SLURRED SPEECH: ICD-10-CM

## 2019-12-06 LAB
HOMOCYSTEINE LEVEL: 9.1 UMOL/L — SIGNIFICANT CHANGE UP
METHYLMALONIC ACID LEVEL: 88 NMOL/L — SIGNIFICANT CHANGE UP (ref 87–318)

## 2019-12-13 ENCOUNTER — APPOINTMENT (OUTPATIENT)
Dept: RHEUMATOLOGY | Facility: CLINIC | Age: 39
End: 2019-12-13
Payer: COMMERCIAL

## 2019-12-13 VITALS
HEIGHT: 64 IN | DIASTOLIC BLOOD PRESSURE: 80 MMHG | WEIGHT: 110 LBS | SYSTOLIC BLOOD PRESSURE: 110 MMHG | BODY MASS INDEX: 18.78 KG/M2 | HEART RATE: 77 BPM | OXYGEN SATURATION: 98 %

## 2019-12-13 DIAGNOSIS — Z78.9 OTHER SPECIFIED HEALTH STATUS: ICD-10-CM

## 2019-12-13 PROCEDURE — 99214 OFFICE O/P EST MOD 30 MIN: CPT

## 2019-12-14 PROBLEM — Z78.9 NO PERTINENT PAST MEDICAL HISTORY: Status: RESOLVED | Noted: 2019-12-14 | Resolved: 2019-12-14

## 2019-12-14 PROBLEM — Z78.9 NON-SMOKER: Status: ACTIVE | Noted: 2019-12-14

## 2019-12-14 NOTE — PHYSICAL EXAM
[General Appearance - Well Nourished] : well nourished [Sclera] : the sclera and conjunctiva were normal [General Appearance - Alert] : alert [General Appearance - Well Developed] : well developed [Neck Appearance] : the appearance of the neck was normal [Oropharynx] : the oropharynx was normal [Respiration, Rhythm And Depth] : normal respiratory rhythm and effort [Heart Sounds] : normal S1 and S2 [Auscultation Breath Sounds / Voice Sounds] : lungs were clear to auscultation bilaterally [Full Pulse] : the pedal pulses are present [Edema] : there was no peripheral edema [Abdomen Tenderness] : non-tender [Supraclavicular Lymph Nodes Enlarged Bilaterally] : supraclavicular [Cervical Lymph Nodes Enlarged Anterior Bilaterally] : anterior cervical [No Spinal Tenderness] : no spinal tenderness [Abnormal Walk] : normal gait [Nail Clubbing] : no clubbing  or cyanosis of the fingernails [Musculoskeletal - Swelling] : no joint swelling seen [Motor Tone] : muscle strength and tone were normal [Deep Tendon Reflexes (DTR)] : deep tendon reflexes were 2+ and symmetric [] : no rash [Motor Exam] : the motor exam was normal [Oriented To Time, Place, And Person] : oriented to person, place, and time [Impaired Insight] : insight and judgment were intact [Affect] : the affect was normal [FreeTextEntry1] : FROM neck, shoulders, elbows, wrists, hands, hips, knees, ankles and feet, including the small joints of the hands and feet without any evidence of inflammatory arthritis no tender points

## 2019-12-14 NOTE — HISTORY OF PRESENT ILLNESS
[FreeTextEntry1] : 38 y/o F with PMH of compartment syndrome of B/L LEs s/p surgery, cervical\par radiculopathy, migraines,  seen in Hospital p/w numbness / tingling of hands / feet,\par disorientation, slowed speech. Patient states that on 10/11/19 she developed R\par hand stiffness, numbness, pain and weakness. For example she was unable to hold\par a cup of coffee or mug with that hand. She also had neck pain. She had a MRI of\par cervical spine and was told she had cervical radiculopathy. She has been having\par physical therapy, and that has improved symptoms greatly, but not 100% yet.\par Patient states that at the time she also had some numbness and pain of the feet\par as well. She has also been told she may have carpal tunnel and thoracic outlet\par syndrome. On 11/24/19 she started to develop increasing numbness and pain of\par B/L feet and also legs. States that she was starting lose her balance and was\par tripping over a lot. She felt like she was hobbling to the bathroom. Over the\par weekend she also developed slowing of speech, felt like her lips weren't able\par to keep up with speaking.\par So far her w/u has been negative, Rheum service was asked to pt to r/o CTD\par She admits to a remote h/o Raynauds. She denies alopecia, oral ulcers, sicca sx\par or joint pains on a daily basis. She denies asthma frequent sinus or ear\par infections.\par She is better since she was discharged but not back to her baseline.

## 2019-12-14 NOTE — REVIEW OF SYSTEMS
[Feeling Tired] : feeling tired [Arthralgias] : arthralgias [Feelings Of Weakness] : feelings of weakness [Negative] : Endocrine

## 2019-12-14 NOTE — ASSESSMENT
[FreeTextEntry1] : 40 y/o F with PMH of compartment syndrome of B/L LEs s/p surgery, cervical radiculopathy, migraines, p/w numbness / tingling of hands / feet, disorientation, slowed speech. Patient states that on 10/11/19 she developed R hand stiffness, numbness, pain and weakness.  She also had neck pain. She was dx with\par cervical radiculopathy with improvement in sx with PT\par She now comes in worsening sx, she has been seen neurology, so far imaging has been normal. Rheum service was asked to see pt to r/o CTD.\par At this time her review of systems is positive for a remote h/o raynauds and her current neurological sx. On my exam, there is a paucity of clinical findings to support the dx for an underlying autoimmune issue.\par \par Positive PAUL-\par - her PAUL is strongly positibe\par - all specific serologies are negative\par - to repeat labs again including vasculitis and APL work up\par - For now will continue with clinical surveillance\par - significance of PAUL discussed at length with pt\par - at this time she does not fulfil criteria for a CTD\par \par Numbness and tingling-\par await neuro input\par \par f/u 4 weeks\par She is aware to call if her sx worsen

## 2020-01-02 ENCOUNTER — APPOINTMENT (OUTPATIENT)
Dept: NEUROLOGY | Facility: CLINIC | Age: 40
End: 2020-01-02
Payer: COMMERCIAL

## 2020-01-02 ENCOUNTER — APPOINTMENT (OUTPATIENT)
Dept: NEUROLOGY | Facility: CLINIC | Age: 40
End: 2020-01-02

## 2020-01-02 DIAGNOSIS — G56.03 CARPAL TUNNEL SYNDROM,BILATERAL UPPER LIMBS: ICD-10-CM

## 2020-01-02 DIAGNOSIS — M79.A29 NONTRAUMATIC COMPARTMENT SYNDROME OF UNSPECIFIED LOWER EXTREMITY: ICD-10-CM

## 2020-01-02 DIAGNOSIS — G89.29 CERVICALGIA: ICD-10-CM

## 2020-01-02 DIAGNOSIS — M54.2 CERVICALGIA: ICD-10-CM

## 2020-01-02 DIAGNOSIS — M79.10 MYALGIA, UNSPECIFIED SITE: ICD-10-CM

## 2020-01-02 DIAGNOSIS — R20.0 ANESTHESIA OF SKIN: ICD-10-CM

## 2020-01-02 DIAGNOSIS — T79.A29A TRAUMATIC COMPARTMENT SYNDROME OF UNSPECIFIED LOWER EXTREMITY, INITIAL ENCOUNTER: ICD-10-CM

## 2020-01-02 DIAGNOSIS — M54.12 RADICULOPATHY, CERVICAL REGION: ICD-10-CM

## 2020-01-02 DIAGNOSIS — R20.2 ANESTHESIA OF SKIN: ICD-10-CM

## 2020-01-02 DIAGNOSIS — Z83.49 FAMILY HISTORY OF OTHER ENDOCRINE, NUTRITIONAL AND METABOLIC DISEASES: ICD-10-CM

## 2020-01-02 PROCEDURE — 95912 NRV CNDJ TEST 11-12 STUDIES: CPT

## 2020-01-02 PROCEDURE — 95886 MUSC TEST DONE W/N TEST COMP: CPT

## 2020-01-02 PROCEDURE — 99213 OFFICE O/P EST LOW 20 MIN: CPT | Mod: 25

## 2020-01-02 NOTE — REASON FOR VISIT
[Post Hospitalization] : a post hospitalization visit [FreeTextEntry1] : For EMG/NCV studies of upper and lower extremities to evaluate for cervical radiculopathy/TOS and mononeuritis multiplex

## 2020-01-02 NOTE — PROCEDURE
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 Small fiber neuropathy cannot be excluded by routine NCV studies.\par \par There is no evidence of acute cervical radiculopathy on the right side. The studies are not diagnostic for right brachial plexus-lower trunk lesion/thoracic outlet syndrome on the right.\par \par There is no evidence of myopathy.

## 2020-01-02 NOTE — REVIEW OF SYSTEMS
[Tingling] : tingling [Numbness] : numbness [As Noted in HPI] : as noted in HPI [Negative] : Heme/Lymph

## 2020-01-02 NOTE — PHYSICAL EXAM
[General Appearance - Well-Appearing] : healthy appearing [Oriented To Time, Place, And Person] : oriented to person, place, and time [Mood] : the mood was normal [Person] : oriented to person [Place] : oriented to place [Time] : oriented to time [Concentration Intact] : normal concentrating ability [Visual Intact] : visual attention was ~T not ~L decreased [Naming Objects] : no difficulty naming common objects [Repeating Phrases] : no difficulty repeating a phrase [Writing A Sentence] : no difficulty writing a sentence [Fluency] : fluency intact [Comprehension] : comprehension intact [Reading] : reading intact [Past History] : adequate knowledge of personal past history [Cranial Nerves Optic (II)] : visual acuity intact bilaterally,  visual fields full to confrontation, pupils equal round and reactive to light [Cranial Nerves Oculomotor (III)] : extraocular motion intact [Cranial Nerves Trigeminal (V)] : facial sensation intact symmetrically [Cranial Nerves Facial (VII)] : face symmetrical [Cranial Nerves Vestibulocochlear (VIII)] : hearing was intact bilaterally [Cranial Nerves Glossopharyngeal (IX)] : tongue and palate midline [Cranial Nerves Accessory (XI - Cranial And Spinal)] : head turning and shoulder shrug symmetric [Cranial Nerves Hypoglossal (XII)] : there was no tongue deviation with protrusion [Motor Tone] : muscle tone was normal in all four extremities [Motor Strength] : muscle strength was normal in all four extremities [No Muscle Atrophy] : normal bulk in all four extremities [Sensation Tactile Decrease] : light touch was intact [Sensation Vibration Decrease] : vibration was intact [Proprioception] : proprioception was intact [Abnormal Walk] : normal gait [Balance] : balance was intact [Past-pointing] : there was no past-pointing [Tremor] : no tremor present [Dysdiadochokinesia Bilaterally] : not present [Coordination - Dysmetria Impaired Finger-to-Nose Bilateral] : not present [2+] : Ankle jerk left 2+ [Plantar Reflex Right Only] : normal on the right [Plantar Reflex Left Only] : normal on the left [PERRL With Normal Accommodation] : pupils were equal in size, round, reactive to light, with normal accommodation [Extraocular Movements] : extraocular movements were intact [No APD] : no afferent pupillary defect [Full Visual Field] : full visual field [Outer Ear] : the ears and nose were normal in appearance [Hearing Threshold Finger Rub Not Yancey] : hearing was normal [] : the neck was supple [Respiration, Rhythm And Depth] : normal respiratory rhythm and effort [Exaggerated Use Of Accessory Muscles For Inspiration] : no accessory muscle use [Heart Sounds] : normal S1 and S2 [Arterial Pulses Carotid] : carotid pulses were normal with no bruits [Edema] : there was no peripheral edema [FreeTextEntry1] : Scars on the shins secondary to prior compartment syndrome release

## 2020-01-02 NOTE — DATA REVIEWED
[de-identified] : 12/2/19: MR brain with and without contrast. No evidence of acute intracranial pathology pediatric fluid level is in dependent portion of right sphenoid sinus consistent with acute sinusitis [de-identified] : 12/2/19: CT angiogram H/N: No significant stenosis or vascular lesion\par 12/3/19: MRI C-spine with and without contrast: Tiny disc herniation at C4-5 and C5-6; normal cord impingement or abnormal spinal cord signal\par MRI thoracic spine with and without contrast: Mild scoliosis, no evidence of acute pathology within the cord

## 2020-01-02 NOTE — HISTORY OF PRESENT ILLNESS
[FreeTextEntry1] : Pt is here for a follow up visit today, was last seen in [facility] on 12/2/19. Patient was admitted with complaints of paresthesias/weakness of lower extremities associated with slowed speech/difficulty articulating and decreased fine motor movements right hand, she had had weakness/pain right hand / arm for over 2 months, was been seen by orthopedics in [facility], workup was in progress to rule out thoracic outlet syndrome/cervical radiculopathy.\par \par During hospital stay, MRI of cervical, thoracic spine and brain were done to rule out myelitis/demyelinating ds, no acute pathology was identified, myositis workup was unremarkable, patient made good recovery was discharged home. Since discharged home, patient has followed up with a rheumatologist, apparently PAUL is positive further labs have been sent.\par \par Patient reports she feels remarkably better, pain is minimal, she complains of heaviness and pain in the soles of both and pressure-like symptoms in calves, the symptoms in legs have been present since she had compartment release surgery lower extremities as a teenager.\par \par Patient is here for EMG/NCV studies of upper and lower extremities.

## 2020-01-02 NOTE — DISCUSSION/SUMMARY
[FreeTextEntry1] : 39-year-old female with PMHx of compartment syndrome LE'x a decade ago, status post surgical release, also history of cervical radiculopathy/migraine headaches related to sports injuries, has had involving intermittent right hand weakness/pain since past 3 months; presented to [facility] on 12/2/19 with complaints of worsening paresthesias/feeling of weakness lower extremities associated with slowed speech and slight disorientation with decreased right FMM. Patient had MRIs of the brain, cervical and thoracic spine that were unremarkable her myelitis/demyelination. Myositis workup revealed normal muscle enzymes. Patient has noted improvement of symptoms, woke up with rheumatology has revealed positive PAUL. EMG/NCV study is of upper and lower extremities reveal:\par \par #1) Mild carpal tunnel syndrome bilateral.\par \par #2) Neck strain/cervicalgia no evidence of cervical radiculopathy or neurogenic TOS\par \par #3) paresthesias/myalgias: No evidence of demyelination or myelitis or peripheral sensory motor polyneuropathy or MNM; small fiber neuropathy cannot be excluded\par \par #4) positive PAUL; rule out connective tissue disorder (history of Raunauds)\par \par - I have recommended patient to follow up with rheumatology for further workup, if neuralgic pain persists, we may consider using medication to alleviate pain.

## 2020-01-03 ENCOUNTER — LABORATORY RESULT (OUTPATIENT)
Age: 40
End: 2020-01-03

## 2020-01-07 LAB
25(OH)D3 SERPL-MCNC: 31.5 NG/ML
A PHAGOCYTOPH IGG TITR SER IF: NORMAL TITER
ALBUMIN SERPL ELPH-MCNC: 4.8 G/DL
ALP BLD-CCNC: 89 U/L
ALT SERPL-CCNC: 17 U/L
ANION GAP SERPL CALC-SCNC: 13 MMOL/L
AST SERPL-CCNC: 20 U/L
B BURGDOR AB SER QL IA: NEGATIVE
B MICROTI IGG TITR SER: NORMAL TITER
BASOPHILS # BLD AUTO: 0.02 K/UL
BASOPHILS NFR BLD AUTO: 0.4 %
BILIRUB SERPL-MCNC: 0.5 MG/DL
BUN SERPL-MCNC: 9 MG/DL
CALCIUM SERPL-MCNC: 9.6 MG/DL
CARDIOLIPIN IGM SER-MCNC: 8.9 MPL
CARDIOLIPIN IGM SER-MCNC: <5 GPL
CCP AB SER IA-ACNC: <8 UNITS
CENTROMERE IGG SER-ACNC: <0.2 CD:130001892
CHLORIDE SERPL-SCNC: 103 MMOL/L
CO2 SERPL-SCNC: 25 MMOL/L
CREAT SERPL-MCNC: 0.76 MG/DL
CREAT SPEC-SCNC: 204 MG/DL
CREAT/PROT UR: 0.1 RATIO
CRP SERPL-MCNC: <0.1 MG/DL
DSDNA AB SER-ACNC: <12 IU/ML
E CHAFFEENSIS IGG TITR SER IF: NORMAL TITER
ENA JO1 AB SER IA-ACNC: <0.2 AL
ENA RNP AB SER IA-ACNC: >8 AL
ENA SM AB SER IA-ACNC: >8 AL
ENA SS-A AB SER IA-ACNC: <0.2 AL
ENA SS-B AB SER IA-ACNC: <0.2 AL
EOSINOPHIL # BLD AUTO: 0.07 K/UL
EOSINOPHIL NFR BLD AUTO: 1.4 %
ERYTHROCYTE [SEDIMENTATION RATE] IN BLOOD BY WESTERGREN METHOD: 9 MM/HR
GLUCOSE SERPL-MCNC: 88 MG/DL
HCT VFR BLD CALC: 43.6 %
HGB BLD-MCNC: 14.1 G/DL
IMM GRANULOCYTES NFR BLD AUTO: 0.2 %
LUPUS ANTICOAGULANT CASCADE REFLEX: NORMAL
LYMPHOCYTES # BLD AUTO: 1.71 K/UL
LYMPHOCYTES NFR BLD AUTO: 34.1 %
MAN DIFF?: NORMAL
MCHC RBC-ENTMCNC: 29.9 PG
MCHC RBC-ENTMCNC: 32.3 GM/DL
MCV RBC AUTO: 92.6 FL
MONOCYTES # BLD AUTO: 0.37 K/UL
MONOCYTES NFR BLD AUTO: 7.4 %
MPO AB + PR3 PNL SER: NORMAL
NEUTROPHILS # BLD AUTO: 2.83 K/UL
NEUTROPHILS NFR BLD AUTO: 56.5 %
PLATELET # BLD AUTO: 206 K/UL
POTASSIUM SERPL-SCNC: 4.4 MMOL/L
PROT SERPL-MCNC: 6.6 G/DL
PROT UR-MCNC: 12 MG/DL
RBC # BLD: 4.71 M/UL
RBC # FLD: 12.5 %
RF+CCP IGG SER-IMP: NEGATIVE
RHEUMATOID FACT SER QL: 10 IU/ML
SODIUM SERPL-SCNC: 141 MMOL/L
THYROGLOB AB SERPL-ACNC: <20 IU/ML
THYROPEROXIDASE AB SERPL IA-ACNC: <10 IU/ML
WBC # FLD AUTO: 5.01 K/UL

## 2020-01-08 LAB
ANA PAT FLD IF-IMP: ABNORMAL
ANA SER IF-ACNC: ABNORMAL
HISTONE AB SER QL: 1.3 UNITS

## 2020-01-09 ENCOUNTER — OTHER (OUTPATIENT)
Age: 40
End: 2020-01-09

## 2020-01-10 ENCOUNTER — FORM ENCOUNTER (OUTPATIENT)
Age: 40
End: 2020-01-10

## 2020-01-11 ENCOUNTER — APPOINTMENT (OUTPATIENT)
Dept: RADIOLOGY | Facility: CLINIC | Age: 40
End: 2020-01-11
Payer: COMMERCIAL

## 2020-01-11 ENCOUNTER — OUTPATIENT (OUTPATIENT)
Dept: OUTPATIENT SERVICES | Facility: HOSPITAL | Age: 40
LOS: 1 days | End: 2020-01-11
Payer: COMMERCIAL

## 2020-01-11 ENCOUNTER — TRANSCRIPTION ENCOUNTER (OUTPATIENT)
Age: 40
End: 2020-01-11

## 2020-01-11 DIAGNOSIS — R76.8 OTHER SPECIFIED ABNORMAL IMMUNOLOGICAL FINDINGS IN SERUM: ICD-10-CM

## 2020-01-11 DIAGNOSIS — Z00.8 ENCOUNTER FOR OTHER GENERAL EXAMINATION: ICD-10-CM

## 2020-01-11 PROCEDURE — 71046 X-RAY EXAM CHEST 2 VIEWS: CPT

## 2020-01-11 PROCEDURE — 71046 X-RAY EXAM CHEST 2 VIEWS: CPT | Mod: 26

## 2020-01-13 ENCOUNTER — APPOINTMENT (OUTPATIENT)
Dept: RHEUMATOLOGY | Facility: CLINIC | Age: 40
End: 2020-01-13
Payer: COMMERCIAL

## 2020-01-13 VITALS
OXYGEN SATURATION: 99 % | HEART RATE: 70 BPM | BODY MASS INDEX: 18.61 KG/M2 | HEIGHT: 64 IN | SYSTOLIC BLOOD PRESSURE: 110 MMHG | WEIGHT: 109 LBS | DIASTOLIC BLOOD PRESSURE: 80 MMHG

## 2020-01-13 DIAGNOSIS — R76.8 OTHER SPECIFIED ABNORMAL IMMUNOLOGICAL FINDINGS IN SERUM: ICD-10-CM

## 2020-01-13 DIAGNOSIS — M35.9 SYSTEMIC INVOLVEMENT OF CONNECTIVE TISSUE, UNSPECIFIED: ICD-10-CM

## 2020-01-13 DIAGNOSIS — I73.00 RAYNAUD'S SYNDROME W/OUT GANGRENE: ICD-10-CM

## 2020-01-13 PROCEDURE — 99215 OFFICE O/P EST HI 40 MIN: CPT

## 2020-01-13 RX ORDER — AZITHROMYCIN 250 MG/1
250 TABLET, FILM COATED ORAL
Qty: 6 | Refills: 0 | Status: DISCONTINUED | COMMUNITY
Start: 2019-11-27

## 2020-01-13 RX ORDER — PROMETHAZINE HYDROCHLORIDE AND DEXTROMETHORPHAN HYDROBROMIDE ORAL SOLUTION 15; 6.25 MG/5ML; MG/5ML
6.25-15 SOLUTION ORAL
Qty: 140 | Refills: 0 | Status: DISCONTINUED | COMMUNITY
Start: 2019-11-27

## 2020-01-13 RX ORDER — PREDNISONE 10 MG/1
10 TABLET ORAL
Qty: 30 | Refills: 0 | Status: DISCONTINUED | COMMUNITY
Start: 2019-10-11

## 2020-01-13 RX ORDER — NAPROXEN 500 MG/1
TABLET ORAL
Refills: 0 | Status: DISCONTINUED | COMMUNITY
End: 2020-01-13

## 2020-01-13 RX ORDER — GABAPENTIN 300 MG/1
300 CAPSULE ORAL
Qty: 90 | Refills: 0 | Status: DISCONTINUED | COMMUNITY
Start: 2019-10-16

## 2020-01-13 RX ORDER — FLUCONAZOLE 150 MG/1
150 TABLET ORAL
Qty: 2 | Refills: 0 | Status: DISCONTINUED | COMMUNITY
Start: 2019-08-30

## 2020-01-13 RX ORDER — CYCLOBENZAPRINE HYDROCHLORIDE 10 MG/1
10 TABLET, FILM COATED ORAL
Qty: 30 | Refills: 0 | Status: DISCONTINUED | COMMUNITY
Start: 2019-10-11

## 2020-01-13 NOTE — HISTORY OF PRESENT ILLNESS
[FreeTextEntry1] : Urgent visit-\par Patient comes in with her father. She called that she's not feeling well. I had spoken to her earlier last week, she was feeling great at that time. She had been off of work for 2 weeks and was finally feeling better. She returned this past week and feels exhausted, she has recurrence of symptoms with numbness and tingling in her hands, her hands and feet cold. She feels exhausted. She complains of abdominal pain, she denies IBS symptoms. She rates her symptoms as a 10 out of 10.\par \par I reviewed her labs from last week her PAUL is positive at 1:2560, she also has ARNP along with the Lester antibody. She has had a chest x-ray which was normal. With neurology she had an EMG that was essentially normal and showed mild median neuropathy.\par She denies visual symptoms or issues with her speech. She denies headaches, GI or  symptoms. She admits to exhaustion and it is overwhelming. She is in tears today.

## 2020-01-13 NOTE — PHYSICAL EXAM
[General Appearance - Alert] : alert [General Appearance - Well Nourished] : well nourished [General Appearance - Well Developed] : well developed [Sclera] : the sclera and conjunctiva were normal [Oropharynx] : the oropharynx was normal [Neck Appearance] : the appearance of the neck was normal [Respiration, Rhythm And Depth] : normal respiratory rhythm and effort [Auscultation Breath Sounds / Voice Sounds] : lungs were clear to auscultation bilaterally [Heart Sounds] : normal S1 and S2 [Full Pulse] : the pedal pulses are present [Edema] : there was no peripheral edema [Abdomen Tenderness] : non-tender [Cervical Lymph Nodes Enlarged Anterior Bilaterally] : anterior cervical [Supraclavicular Lymph Nodes Enlarged Bilaterally] : supraclavicular [No Spinal Tenderness] : no spinal tenderness [Abnormal Walk] : normal gait [Nail Clubbing] : no clubbing  or cyanosis of the fingernails [Musculoskeletal - Swelling] : no joint swelling seen [Motor Tone] : muscle strength and tone were normal [FreeTextEntry1] : FROM neck, shoulders, elbows, wrists, hands, hips, knees, ankles and feet, including the small joints of the hands and feet without any evidence of inflammatory arthritis no tender points [] : no rash [Deep Tendon Reflexes (DTR)] : deep tendon reflexes were 2+ and symmetric [Motor Exam] : the motor exam was normal [Oriented To Time, Place, And Person] : oriented to person, place, and time [Impaired Insight] : insight and judgment were intact [Affect] : the affect was normal

## 2020-01-13 NOTE — ASSESSMENT
[FreeTextEntry1] : 38 y/o F with PMH of compartment syndrome of B/L LEs s/p surgery, cervical radiculopathy, migraines, initially seen at  with numbness / tingling of hands / feet, disorientation, slowed speech. Patient states that on 10/11/19 she developed R hand stiffness, numbness, pain and weakness.  She also had neck pain. She was dx with\par cervical radiculopathy with improvement in sx with PT\par She now comes in worsening sx, she has been seen neurology, so far imaging has been normal. Rheum service was asked to see pt to r/o CTD.\par At this time her review of systems is positive for a remote h/o raynauds and her current neurological sx. On my exam, there is a paucity of clinical findings to support the dx for an underlying autoimmune issue.\par \par Positive PAUL-\par - her PAUL is strongly positive\par - on repeat labs her RNP/ Sm is positive along with histone Ab which may be a false positive\par \par Undifferentiated connective tissue disease-\par On repeat labs her PAUL is positive, RNP and Sm ab as well as histoone antibodies positive. RNP antibodies associated with mixed connective tissue disease. At this time she has symptoms consistent with Raynaud's phenomenon. She may fulfill criteria for undifferentiated connective tissue disease less likely mixed connective tissue disease. Her chest x-ray is normal. She is to have a baseline echocardiogram. I will send her for swallowing study in the near future. I had a long discussion with the patient as well as her father, I would consider using Plaquenil at this time. She has apprehensions at this time.\par \par Raynaud's phenomena and-\par Stressed importance of keeping extremities warm\par -Currently there is no indication for medication use\par - stressed avoiding stress as it will exacerbate her sx\par \par Followup 3 weeks

## 2020-01-13 NOTE — REVIEW OF SYSTEMS
[Feeling Tired] : feeling tired [Arthralgias] : arthralgias [Feelings Of Weakness] : feelings of weakness [Negative] : Heme/Lymph

## 2020-02-04 ENCOUNTER — APPOINTMENT (OUTPATIENT)
Dept: RHEUMATOLOGY | Facility: CLINIC | Age: 40
End: 2020-02-04

## 2020-02-06 ENCOUNTER — APPOINTMENT (OUTPATIENT)
Dept: NEUROLOGY | Facility: CLINIC | Age: 40
End: 2020-02-06

## 2020-02-12 ENCOUNTER — TRANSCRIPTION ENCOUNTER (OUTPATIENT)
Age: 40
End: 2020-02-12

## 2020-02-17 ENCOUNTER — TRANSCRIPTION ENCOUNTER (OUTPATIENT)
Age: 40
End: 2020-02-17

## 2020-03-11 ENCOUNTER — APPOINTMENT (OUTPATIENT)
Dept: NEUROLOGY | Facility: CLINIC | Age: 40
End: 2020-03-11

## 2022-07-25 NOTE — ED ADULT NURSE NOTE - NSFALLRSKASSESASSIST_ED_ALL_ED
How Severe Is Your Skin Lesion?: mild Have Your Skin Lesions Been Treated?: not been treated Is This A New Presentation, Or A Follow-Up?: Skin Lesion Additional History: Patient states that she had some spots LN2 at a different office 1/22 no

## 2022-09-07 NOTE — DISCHARGE NOTE NURSING/CASE MANAGEMENT/SOCIAL WORK - [facility]AN_GEN_ALL_CORE
Lab Results   Component Value Date    VITD25 38.7 02/28/2022
If you are a smoker, it is important for your health to stop smoking. Please be aware that second hand smoke is also harmful.

## 2024-12-10 NOTE — DISCHARGE NOTE PROVIDER - [facility]STAIRS_GEN_ALL_CORE
Show Applicator Variable?: Yes Duration Of Freeze Thaw-Cycle (Seconds): 5 Consent: The patient's consent was obtained including but not limited to risks of crusting, scabbing, blistering, scarring, darker or lighter pigmentary change, recurrence, incomplete removal and infection. Detail Level: Detailed Number Of Freeze-Thaw Cycles: 1 freeze-thaw cycle No Render Post-Care Instructions In Note?: no Post-Care Instructions: I reviewed with the patient in detail post-care instructions. Patient is to wear sunprotection, and avoid picking at any of the treated lesions. Pt may apply Vaseline to crusted or scabbing areas. Duration Of Freeze Thaw-Cycle (Seconds): 10 Spray Paint Text: The liquid nitrogen was applied to the skin utilizing a spray paint frosting technique. Medical Necessity Information: It is in your best interest to select a reason for this procedure from the list below. All of these items fulfill various CMS LCD requirements except the new and changing color options. Medical Necessity Clause: This procedure was medically necessary because the lesions that were treated were: